# Patient Record
Sex: MALE | Race: WHITE
[De-identification: names, ages, dates, MRNs, and addresses within clinical notes are randomized per-mention and may not be internally consistent; named-entity substitution may affect disease eponyms.]

---

## 2019-01-14 ENCOUNTER — HOSPITAL ENCOUNTER (EMERGENCY)
Dept: HOSPITAL 80 - FED | Age: 19
Discharge: HOME | End: 2019-01-14
Payer: COMMERCIAL

## 2019-01-14 VITALS — SYSTOLIC BLOOD PRESSURE: 141 MMHG | DIASTOLIC BLOOD PRESSURE: 81 MMHG

## 2019-01-14 DIAGNOSIS — R10.31: Primary | ICD-10-CM

## 2019-01-14 DIAGNOSIS — E86.9: ICD-10-CM

## 2019-01-14 DIAGNOSIS — F90.9: ICD-10-CM

## 2019-01-14 LAB — PLATELET # BLD: 230 10^3/UL (ref 150–400)

## 2019-01-14 NOTE — EDPHY
General


Time Seen by Provider: 01/14/19 14:35


Narrative: 





CLINICAL IMPRESSION: 





Right flank pain, right-sided abdominal pain, CYNDI





ASSESSMENT/PLAN: 





Patient is a 19-year-old male with a significant medical history of attention 

deficit hyperactivity disorder who presents to the emergency department with 

right flank pain radiating into his right abdomen.  Patient is afebrile, he is 

in no acute distress and not toxic-appearing.  His abdomen was soft and 

nontender to palpation in all quadrants, no peritoneal signs or evidence of a 

surgical abdomen; right CVA tenderness. CBC revealed no evidence of 

leukocytosis or shift.  His vital signs were reviewed and there was no evidence 

of sepsis or serious bacterial illness. BMP revealed a KI with a creatinine of 

1.5, no metabolic abnormality. Lipase and hepatic panel grossly unremarkable 

without evidence of acute pancreatitis, acute hepatitis or acute hepatobiliary 

obstruction.  Urine with no evidence of protein urea or infection.  Right upper 

quadrant ultrasound revealed a contracted gallbladder without any evidence of 

becca cholecystic fluid or inflammation.  CT abdomen and pelvis within normal 

appearing appendix, mild to moderate constipation, no obstructive uropathy and 

degenerative disc disease. 





History and physical examination is most consistent with CYNDI, right flank and 

right-sided abdominal pain.  I suspect his elevated creatinine is pre renal, no 

significant medical history or history of renal disease.  Query musculoskeletal 

etiology of right flank pain in light of reassuring workup in the emergency 

department.  There were no clinical findings to suggest appendicitis, 

cholecystitis, kidney stone, pancreatitis, ACS, pyelonephritis, perforated 

viscus, diverticulitis, hernia, AAA, mesenteric ischemia, or additional 

emergent intra-abdominal process.  He had no testicular complaints to suggest 

testicular torsion or epididymitis.  He was given 2 L of IV fluids and a 

Lidoderm patch was placed with improvement of his symptoms. On repeat 

examination the patient is well-appearing, his abdomen remained soft without 

evidence of a surgical abdomen. The patient is well established with his 

primary care provider and understands the importance of close follow-up.  He 

also understands that he will need his kidney function rechecked and to avoid 

NSAIDs in the meantime.  Strict return precautions discussed- he will return 

for increased or unmanageable pain, midline pain, fever, nausea, vomiting, 

worsening or localizing abdominal pain or for any other new, worsening or 

worrisome symptoms.  Patient verbalizes understanding he is in agreement with 

this plan.  





DIFFERENTIAL DX: 


Abdominal pain including but not limited to appendicitis, cholecystitis, 

gastritis and urinary tract infection.





_________________ 


CHIEF COMPLAINT:  Right flank pain radiating into right abdomen


_________________ 


HPI: 





Patient is a 19-year-old male with a significant medical history of attention 

deficit hyperactivity disorder who presents to the emergency department 

complaining of right flank pain and right upper abdominal pain.  Patient 

reports on Saturday morning he woke up feeling and upset stomach and decreased 

appetite.  He went skiing, proceeded to have some right-sided flank pain 

throughout the day and started radiating into his right mid and upper quadrant.

  He does have some ongoing chronic lumbar back pain which is unchanged.  

Patient reports that the pain at rest is very achy, he does experience some 

sharp sensations.  The pain worsens with palpation.  Patient was seen and 

evaluated at urgent care and sent here for further evaluation.  Patient denies 

any fevers or chills.  He has had low-grade nausea as mentioned however has had 

no vomiting.  He denies any chest pain or shortness of breath.  He denies any 

trauma or other recent injury.  He has no history of abdominal surgeries and 

denies any testicular pain or swelling.  He has had no urinary symptoms to 

include dysuria, hematuria or frequency.  Bowel movements have been regular, no 

history of constipation.  Patient denies saddle paresthesias, lower extremity 

numbness, tingling, major motor weakness, urinary retention or bowel/bladder 

incontinence. 


_________________ 


PMH:  Attention deficit hyperactivity disorder 


Pertinent Past Surgical History:  Denies 


Family History:  Not contributory 


Social History:  Occasional alcohol, denies illicit drug use, denies smoking 


_________________ 


REVIEW OF SYSTEMS: 


All other systems negative 


Constitutional: Decreased appetite.  No fever, no chills. 


Eyes: No discharge, vision change 


ENT: No sore throat, congestion, ear pain. 


Cardiovascular: No chest pain, no palpitations. 


Respiratory: No cough, no shortness of breath. 


Gastrointestinal: Right-sided abdominal pain.  No no vomiting, diarrhea. 


Genitourinary: Right-sided flank pain.  No hematuria, dysuria, pelvic pain 


Musculoskeletal: Chronic low back pain.  No joint swelling, joint pain, 

myalgias. 


Skin: No rashes, color change. 


Neurological: No headache, dizziness, weakness. 


_________________ 


PHYSICAL EXAM: 


General Appearance:  Well developed, well appearing and in no acute distress


HENT: 


Normocephalic, atraumatic.


Bilateral external ears are normal.  Bilateral tympanic membranes are normal 

with pearly gray reflex.


Nares are clear, mucosa is pink.


Oropharynx is clear, uvula is midline.  There is no tonsillar enlargement or 

exudate.


The dentition is normal.


Eyes: PERRLA, no acute vision change, nystagmus, swelling, discharge, pain or 

photosensitivity. Conjunctiva pink, no pallor or injection 


Neck: Supple, nontender, no lymphadenopathy, no midline pain, FROM, no 

meningismus. 


Respiratory: There are no retractions, lungs are clear to auscultation. 


Cardiac: Regular rate and rhythm, no murmurs or gallops. 


Gastrointestinal: 


Patient's abdomen is soft and nondistended.  On initial examination there is no 

appreciable tenderness to palpation in any quadrant.  No masses/hernia, no 

rigidity, guarding or focal peritoneal findings.  Right CVA tenderness.


Neurological:  Alert and oriented x 3, CN 2-12 grossly intact, normal gait no 

ataxia, DTR's intact, normal sensation and strength 


Skin: Warm, dry, no rashes, no nodules on palpation. 


Musculoskeletal: Extremities are symmetrical, full range of motion, no 

tenderness, deformity, swelling, or erythema. 


Back:


No step-off, palpable bony abnormality, edema, erythema or ecchymosis of the 

cervical, thoracic or lumbar spines.


No midline tenderness to palpation of the cervical, thoracic or lumbar spines.  

Full range of motion of all spines.


5/5 and equal strength of the UEs and LEs bilaterally including shoulder shrug.


Pulses: 2+ and equal radial, DP and PT pulses bilaterally.


Sensation intact and symmetric to light touch from face, UEs and LEs 

bilaterally.


Straight leg raise negative bilaterally.


Psychiatric: Patient is oriented X 3, there is no agitation. 


_________________ 


MEDICAL DECISION MAKING: 


Patient was seen independently. Secondary supervising physician at time of 

evaluation was Dr. Wong. 


Diagnosis:  Right flank pain, a KI. New, requires workup 


Summary:  See Assessment and Plan for summary of ED visit  


Clinical lab tests:  ordered / reviewed. 


Independent visualization of images, tracing, or specimens:  Yes. 


Decision to obtain medical records or history from someone other than the 

patient:  No 


Review / Summarize previous medical records:  No 


Discussed patient with another provider:  Yes, Dr. Wong 


Patient Progress:  Stable, discharge. 





- Diagnostics


Imaging Results: 


 Imaging Impressions





Abdomen Ultrasound  01/14/19 14:47


Impression:


 


1. Partially contracted gallbladder, limiting assessment. If there is 

progression of the patient's symptoms, repeat imaging after being NPO for 6 to 

8 hours may be considered. There is no bile duct dilatation.


2. Mild increased echogenicity of the right renal cortex, which is a 

nonspecific finding but has been reported in individuals with medical renal 

disease. Follow-up renal function tests are suggested. There is no evidence of 

obstructive uropathy or perinephric fluid.


 


Findings were discussed with DILLON Friend (Krissey) at 15:56, on 1/14/2019.


 


 








Abdomen CT  01/14/19 16:11


Impression: 


 


1. Partially-contracted gallbladder, with no pericholecystic fluid or bile duct 

dilatation.


2. There is no evidence of obstructive uropathy.


3. Normal appearance of the appendix.


4. Mild-to-moderate constipation.


5. There is some accelerated degenerative change of the lower thoracic spine, 

with mild broad-based circumferential disk bulging at L4-L5 and L5-S1 also 

noted.


 


Findings were discussed with DILLON Friend (Krissey) at 16:51, on 1/14/2019.


 


 














- History


Smoking Status: Never smoked





- Objective


Vital Signs: 


 Initial Vital Signs











Temperature (C)  36.5 C   01/14/19 13:30


 


Heart Rate  65   01/14/19 13:30


 


Respiratory Rate  16   01/14/19 13:30


 


Blood Pressure  116/67   01/14/19 13:30


 


O2 Sat (%)  98   01/14/19 13:30








 











O2 Delivery Mode               Room Air














Allergies/Adverse Reactions: 


 





No Known Allergies Allergy (Unverified 01/14/19 13:29)


 








Home Medications: 














 Medication  Instructions  Recorded


 


Concerta  01/14/19











Laboratory Results: 


 Laboratory Results





 01/14/19 14:26 





 01/14/19 14:26 





 











  01/14/19 01/14/19 01/14/19





  14:26 14:26 14:20


 


WBC    8.77 10^3/uL 10^3/uL  





    (3.80-9.50)  


 


RBC    4.94 10^6/uL 10^6/uL  





    (4.40-6.38)  


 


Hgb    15.8 g/dL g/dL  





    (13.7-17.5)  


 


Hct    46.5 % %  





    (40.0-51.0)  


 


MCV    94.1 fL fL  





    (81.5-99.8)  


 


MCH    32.0 pg pg  





    (27.9-34.1)  


 


MCHC    34.0 g/dL g/dL  





    (32.4-36.7)  


 


RDW    12.1 % %  





    (11.5-15.2)  


 


Plt Count    230 10^3/uL 10^3/uL  





    (150-400)  


 


MPV    9.9 fL fL  





    (8.7-11.7)  


 


Neut % (Auto)    63.3 % %  





    (39.3-74.2)  


 


Lymph % (Auto)    24.5 % %  





    (15.0-45.0)  


 


Mono % (Auto)    7.8 % %  





    (4.5-13.0)  


 


Eos % (Auto)    3.5 % %  





    (0.6-7.6)  


 


Baso % (Auto)    0.7 % %  





    (0.3-1.7)  


 


Nucleat RBC Rel Count    0.0 % %  





    (0.0-0.2)  


 


Absolute Neuts (auto)    5.55 10^3/uL 10^3/uL  





    (1.70-6.50)  


 


Absolute Lymphs (auto)    2.15 10^3/uL 10^3/uL  





    (1.00-3.00)  


 


Absolute Monos (auto)    0.68 10^3/uL 10^3/uL  





    (0.30-0.80)  


 


Absolute Eos (auto)    0.31 10^3/uL 10^3/uL  





    (0.03-0.40)  


 


Absolute Basos (auto)    0.06 10^3/uL 10^3/uL  





    (0.02-0.10)  


 


Absolute Nucleated RBC    0.00 10^3/uL 10^3/uL  





    (0-0.01)  


 


Immature Gran %    0.2 % %  





    (0.0-1.1)  


 


Immature Gran #    0.02 10^3/uL 10^3/uL  





    (0.00-0.10)  


 


Sodium  141 mEq/L mEq/L    





   (135-145)   


 


Potassium  4.1 mEq/L mEq/L    





   (3.5-5.2)   


 


Chloride  106 mEq/L mEq/L    





   ()   


 


Carbon Dioxide  26 mEq/l mEq/l    





   (22-31)   


 


Anion Gap  9 mEq/L mEq/L    





   (6-14)   


 


BUN  26 mg/dL H mg/dL    





   (7-23)   


 


Creatinine  1.5 mg/dL H mg/dL    





   (0.7-1.3)   


 


Estimated GFR  59     





    


 


Glucose  80 mg/dL mg/dL    





   ()   


 


Calcium  9.5 mg/dL mg/dL    





   (8.5-10.4)   


 


Total Bilirubin  0.5 mg/dL mg/dL    





   (0.1-1.4)   


 


Conjugated Bilirubin  0.3 mg/dL mg/dL    





   (0.0-0.5)   


 


Unconjugated Bilirubin  0.2 mg/dL mg/dL    





   (0.0-1.1)   


 


AST  27 IU/L IU/L    





   (17-59)   


 


ALT  31 IU/L IU/L    





   (21-72)   


 


Alkaline Phosphatase  84 IU/L IU/L    





   ()   


 


Total Protein  7.7 g/dL g/dL    





   (6.3-8.2)   


 


Albumin  4.9 g/dL g/dL    





   (3.5-5.0)   


 


Lipase  114 IU/L IU/L    





   ()   


 


Urine Color      PALE YELLOW 





    


 


Urine Appearance      CLEAR 





    


 


Urine pH      6.0 





     (5.0-7.5) 


 


Ur Specific Gravity      1.003 





     (1.002-1.030) 


 


Urine Protein      NEGATIVE 





     (NEGATIVE) 


 


Urine Ketones      NEGATIVE 





     (NEGATIVE) 


 


Urine Blood      NEGATIVE 





     (NEGATIVE) 


 


Urine Nitrate      NEGATIVE 





     (NEGATIVE) 


 


Urine Bilirubin      NEGATIVE 





     (NEGATIVE) 


 


Urine Urobilinogen      NEGATIVE EU EU





     (0.2-1.0) 


 


Ur Leukocyte Esterase      NEGATIVE 





     (NEGATIVE) 


 


Urine Glucose      NEGATIVE 





     (NEGATIVE) 











Medications Given: 


 








Discontinued Medications





Sodium Chloride (Ns)  1,000 mls @ 0 mls/hr IV ONCE ONE


   PRN Reason: Wide Open


   Stop: 01/14/19 16:01


   Last Admin: 01/14/19 16:13 Dose:  1,000 mls


Sodium Chloride (Ns)  1,000 mls @ 0 mls/hr IV ONCE ONE


   PRN Reason: Wide Open


   Stop: 01/14/19 16:08


   Last Admin: 01/14/19 16:13 Dose:  1,000 mls


Miscellaneous Medication (Icy Hot Lidocaine/Menthol 4%/1% Patch)  1 patch TD 

EDNOW ONE


   Stop: 01/14/19 16:58


   Last Admin: 01/14/19 17:48 Dose:  1 patch








Departure





- Departure


Disposition: Home, Routine, Self-Care


Clinical Impression: 


 Flank pain





Condition: Good


Instructions:  Flank Pain (ED)


Additional Instructions: 


DISCHARGE INSTRUCTIONS FROM YOUR DOCTOR 


Thank you for visiting our emergency department today. Please keep in mind that 

discharge from the emergency department does not mean that there is nothing 

wrong - it simply means that we have not identified an emergency condition that 

requires further evaluation or treatment in the hospital. You should always 

plan to follow up with primary care for re-evaluation of your condition in the 

next 2-3 days. 





Your laboratory studies and imaging today is very reassuring.  It is possible 

that your pain is musculoskeletal in nature.  The majority of back pain will 

improve regardless of treatment within 4-6 weeks. Regardless, I recommend you 

follow up with primary care for recheck as soon as possible. Additional 

evaluation as an outpatient may be needed, and further therapeutic modalities 

such as chiropractic or PT may be helpful. 





Rest. Avoid lifting greater than 10-15 pounds. Avoid twisting or prolonged 

sitting.





Movement and gentle walking is good for your back. Try to walk for 15-10 

minutes on an even surface 3 or 4 times a day as tolerated and increase gentle 

exercise as your back improves. 





Apply ice to your low back during acute pain phase, later a heating pad set to 

a low setting or hot tub may be helpful to help relax muscles.





If you feel that the pain patch to help you, you may purchase this over-the-

counter at your pharmacy.  Please follow the instructions on the packaging. 

Salonpas.





Your kidney function was mildly elevated today and will need to be rechecked as 

an outpatient with your primary care provider.  Avoid anti-inflammatories such 

as ibuprofen, Motrin, Advil or Aleve.





You may take Tylenol as needed for discomfort, I recommend 500 mg to a 1000 mg 

every 6-8 hours.  Do not exceed 4000 mg in a 24 hr period.





Schedule a follow-up appointment with your primary care physician in the next 2-

3 days for re-evaluation. You may require further treatment, physical therapy 

and/or further future testing. 





Return for increased or unmanageable pain, new injury, new midline back pain, 

numbness, tingling, weakness of your legs, loss of bowel or bladder control, 

inability to urinate, burning or pain with urination, blood in the urine, fever

, chills, abdominal pain, vomiting, difficulty walking, dizziness, fainting, 

chest pain, shortness of breath, neck pain, neck stiffness, other site of back 

pain, calf pain, leg redness or swelling, or for any other new, worsening or 

worrisome symptoms.





People present with illnesses and injuries in different ways, and it is always 

possible that we have missed something. You may always return for re-evaluation 

if symptoms worsen or if they are not improving or if you develop new/different 

symptoms. 





Again, thank you for choosing our emergency department. We hope that you feel 

better.


Referrals: 


SAMIR VASQUEZ [Other] - 1-2 days without fail